# Patient Record
Sex: MALE | Race: WHITE | NOT HISPANIC OR LATINO | Employment: FULL TIME | URBAN - NONMETROPOLITAN AREA
[De-identification: names, ages, dates, MRNs, and addresses within clinical notes are randomized per-mention and may not be internally consistent; named-entity substitution may affect disease eponyms.]

---

## 2023-03-08 ENCOUNTER — TELEPHONE (OUTPATIENT)
Dept: PRIMARY CARE | Facility: CLINIC | Age: 53
End: 2023-03-08

## 2023-03-08 NOTE — TELEPHONE ENCOUNTER
The patient is requesting a letter for work. He called off on Saturday due to a stomach ache. He did not go to an urgent care to be seen.  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  The patient can be reached at the number listed

## 2023-12-06 ENCOUNTER — TELEPHONE (OUTPATIENT)
Dept: PRIMARY CARE | Facility: CLINIC | Age: 53
End: 2023-12-06
Payer: COMMERCIAL

## 2023-12-06 DIAGNOSIS — T59.811A SMOKE INHALATION: Primary | ICD-10-CM
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
## 2023-12-11 ENCOUNTER — OFFICE VISIT (OUTPATIENT)
Dept: PULMONOLOGY | Facility: CLINIC | Age: 53
End: 2023-12-11
Payer: COMMERCIAL

## 2023-12-11 ENCOUNTER — ANCILLARY PROCEDURE (OUTPATIENT)
Dept: RADIOLOGY | Facility: CLINIC | Age: 53
End: 2023-12-11
Payer: COMMERCIAL

## 2023-12-11 ENCOUNTER — LAB (OUTPATIENT)
Dept: LAB | Facility: LAB | Age: 53
End: 2023-12-11
Payer: COMMERCIAL

## 2023-12-11 VITALS
DIASTOLIC BLOOD PRESSURE: 76 MMHG | SYSTOLIC BLOOD PRESSURE: 117 MMHG | BODY MASS INDEX: 29.34 KG/M2 | HEART RATE: 71 BPM | WEIGHT: 210.4 LBS | TEMPERATURE: 96.9 F | OXYGEN SATURATION: 99 %

## 2023-12-11 DIAGNOSIS — T59.811A SMOKE INHALATION: ICD-10-CM

## 2023-12-11 DIAGNOSIS — R05.3 CHRONIC COUGH: ICD-10-CM

## 2023-12-11 DIAGNOSIS — R06.09 DYSPNEA ON EXERTION: Primary | ICD-10-CM

## 2023-12-11 PROCEDURE — 71046 X-RAY EXAM CHEST 2 VIEWS: CPT | Mod: FY

## 2023-12-11 PROCEDURE — 71046 X-RAY EXAM CHEST 2 VIEWS: CPT | Performed by: STUDENT IN AN ORGANIZED HEALTH CARE EDUCATION/TRAINING PROGRAM

## 2023-12-11 PROCEDURE — 4004F PT TOBACCO SCREEN RCVD TLK: CPT | Performed by: INTERNAL MEDICINE

## 2023-12-11 PROCEDURE — 86003 ALLG SPEC IGE CRUDE XTRC EA: CPT

## 2023-12-11 PROCEDURE — 36415 COLL VENOUS BLD VENIPUNCTURE: CPT

## 2023-12-11 PROCEDURE — 82785 ASSAY OF IGE: CPT

## 2023-12-11 PROCEDURE — 99204 OFFICE O/P NEW MOD 45 MIN: CPT | Performed by: INTERNAL MEDICINE

## 2023-12-11 NOTE — PROGRESS NOTES
Subjective   Patient ID: Asher Hardin is a 53 y.o. male who presents for Cough and Shortness of Breath.  HPI  This is a 53-year-old  male who is currently a  for the city of [place].  He has had problems with increased shortness of breath and cough with occasionally some yellow streaking in the sputum.  He has a past history of smoking a pack a day for 25 years and quit 10 years ago.  Feels at this point that he cannot get a deep breath.  He is also short of breath with increased activity.  This has been more a problem for him over the last 2 months.  He denies any significant change in exposure to gas or smoke or fumes.  Denies any wheezing or hemoptysis.  He has not had any recent chest x-rays.    He was surgically treated for Alfaro's esophagus.  He has also had procedures on his knees and shoulders that were endoscopically done.  His family history is positive for mother and father both being smokers and  from lung cancer.  The patient admits to 2-3 beers at 2-2 times per week.  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  He was born and raised in [place].  He reports that he has never been admitted to the hospital or urgent care for his breathing.  Review of Systems  Patient has lost about 30 pounds with the diet change.  His vision is good with his glasses.  He does have discomfort in both knees.  He was a smoker in the past as reported above all other review of systems were noncontributory.  Refer to the HPI.  Objective   Physical Exam  Oxygen saturation today was 100% on room air.  HEENT the patient has a class IV airway and there was no inflammation noted on examination of the oropharynx.  Patient has been told that he snores and has stop breathing episodes at nighttime.  Pulmonary, diminished breath sounds with few expiratory rhonchi bilaterally.  Cardio, heart sounds are regular rate and rhythm.  GI, bowel sounds are heard in all quadrants no tenderness  on palpation of the abdomen.  Extremities, no pretibial edema cyanosis or clubbing..  Psych, the patient is alert and oriented x 3.  The patient does not appear to be dyspneic at rest today in the office.  Assessment/Plan        Impressions:  1.  Dyspnea on exertion.  2.  Chronic cough.  3.  Past history of smoking.    Recommendations:  1.  Complete pulmonary function testing.  2.  Chest x-ray-PA and lateral.  3.  FeNO.  4.  RAST testing.  5.  Follow-up with the patient after his testing is completed and have further recommendations at that time.      This note was transcribed using the Dragon Dictation system.  There may be grammatical, punctuation, or verbiage errors that occur with voice recognition programs.    Kurt Arriaga DO 12/11/23 12:32 PM

## 2023-12-12 LAB

## 2023-12-19 ENCOUNTER — OFFICE VISIT (OUTPATIENT)
Dept: PULMONOLOGY | Facility: CLINIC | Age: 53
End: 2023-12-19
Payer: COMMERCIAL

## 2023-12-19 ENCOUNTER — HOSPITAL ENCOUNTER (OUTPATIENT)
Dept: RESPIRATORY THERAPY | Facility: HOSPITAL | Age: 53
Discharge: HOME | End: 2023-12-19
Payer: COMMERCIAL

## 2023-12-19 ENCOUNTER — APPOINTMENT (OUTPATIENT)
Dept: CARDIOLOGY | Facility: HOSPITAL | Age: 53
End: 2023-12-19
Payer: COMMERCIAL

## 2023-12-19 VITALS
OXYGEN SATURATION: 96 % | HEIGHT: 72 IN | HEART RATE: 73 BPM | DIASTOLIC BLOOD PRESSURE: 66 MMHG | BODY MASS INDEX: 28.85 KG/M2 | SYSTOLIC BLOOD PRESSURE: 104 MMHG | WEIGHT: 213 LBS

## 2023-12-19 DIAGNOSIS — T59.811A SMOKE INHALATION: ICD-10-CM

## 2023-12-19 DIAGNOSIS — R06.09 DYSPNEA ON EXERTION: ICD-10-CM

## 2023-12-19 DIAGNOSIS — J44.9 OBSTRUCTIVE AIRWAY DISEASE (MULTI): Primary | ICD-10-CM

## 2023-12-19 PROCEDURE — 99213 OFFICE O/P EST LOW 20 MIN: CPT | Performed by: INTERNAL MEDICINE

## 2023-12-19 PROCEDURE — 94729 DIFFUSING CAPACITY: CPT

## 2023-12-19 PROCEDURE — 4004F PT TOBACCO SCREEN RCVD TLK: CPT | Performed by: INTERNAL MEDICINE

## 2023-12-19 RX ORDER — ALBUTEROL SULFATE 90 UG/1
1 AEROSOL, METERED RESPIRATORY (INHALATION) AS NEEDED
COMMUNITY
End: 2023-12-19 | Stop reason: SDUPTHER

## 2023-12-19 RX ORDER — FLUTICASONE FUROATE AND VILANTEROL 100; 25 UG/1; UG/1
1 POWDER RESPIRATORY (INHALATION) DAILY
COMMUNITY
End: 2023-12-19 | Stop reason: SDUPTHER

## 2023-12-21 RX ORDER — FLUTICASONE FUROATE AND VILANTEROL 100; 25 UG/1; UG/1
1 POWDER RESPIRATORY (INHALATION) DAILY
Qty: 60 EACH | Refills: 5 | Status: SHIPPED | OUTPATIENT
Start: 2023-12-21

## 2023-12-21 RX ORDER — ALBUTEROL SULFATE 90 UG/1
1 AEROSOL, METERED RESPIRATORY (INHALATION) AS NEEDED
Qty: 18 G | Refills: 11 | Status: SHIPPED | OUTPATIENT
Start: 2023-12-21

## 2023-12-21 NOTE — PROGRESS NOTES
Subjective   Patient ID: Asher Hardin is a 53 y.o. male who presents for Follow-up (REV CHEST XRAY AND PFT).  HPI  Patient was seen today in the office on a follow-up visit.  He had pulmonary function testing done earlier today.  This indicated mild obstructive airway disease with significant improvement postbronchodilator.  There was also some evidence of air trapping.  He had normal gas transfer.  Review of Systems  Patient's chest x-ray from 12/11/2023 looked clear.  Objective   Physical Exam  Pulmonary, diminished breath sounds bilaterally.  Cardio, heart sounds are regular rate and rhythm.  Extremities, no pretibial edema cyanosis or clubbing.  Psych, patient was alert and oriented x 3.  The patient did not appear severely dyspneic with activity in the office.  Assessment/Plan        Impressions:  1.  Mild obstructive airway disease.  2.  Dyspnea on exertion.  Recommendations:  1.  Started the patient on Breo 100/25 inhaler to be taken daily.  2.  Albuterol MDI 2 puffs twice daily as needed shortness of breath.  3.  Follow-up with the patient in 3 weeks.      This note was transcribed using the Dragon Dictation system.  There may be grammatical, punctuation, or verbiage errors that occur with voice recognition programs.    Kurt Arriaga,  12/21/23 7:48 AM

## 2024-01-10 PROBLEM — K22.70 BARRETT'S ESOPHAGUS: Status: ACTIVE | Noted: 2024-01-10

## 2024-01-10 PROBLEM — S80.819A ABRASION OF LEG: Status: ACTIVE | Noted: 2024-01-10

## 2024-01-10 PROBLEM — K21.9 GERD (GASTROESOPHAGEAL REFLUX DISEASE): Status: ACTIVE | Noted: 2024-01-10

## 2024-01-10 PROBLEM — E78.5 HYPERLIPIDEMIA: Status: ACTIVE | Noted: 2024-01-10

## 2024-01-11 ENCOUNTER — OFFICE VISIT (OUTPATIENT)
Dept: PRIMARY CARE | Facility: CLINIC | Age: 54
End: 2024-01-11
Payer: COMMERCIAL

## 2024-01-11 ENCOUNTER — LAB (OUTPATIENT)
Dept: LAB | Facility: LAB | Age: 54
End: 2024-01-11
Payer: COMMERCIAL

## 2024-01-11 VITALS
TEMPERATURE: 98 F | BODY MASS INDEX: 29.12 KG/M2 | SYSTOLIC BLOOD PRESSURE: 129 MMHG | WEIGHT: 215 LBS | RESPIRATION RATE: 14 BRPM | HEIGHT: 72 IN | HEART RATE: 69 BPM | DIASTOLIC BLOOD PRESSURE: 77 MMHG | OXYGEN SATURATION: 97 %

## 2024-01-11 DIAGNOSIS — Z00.00 WELLNESS EXAMINATION: ICD-10-CM

## 2024-01-11 DIAGNOSIS — Z11.59 NEED FOR HEPATITIS C SCREENING TEST: ICD-10-CM

## 2024-01-11 DIAGNOSIS — E78.2 MIXED HYPERLIPIDEMIA: ICD-10-CM

## 2024-01-11 DIAGNOSIS — Z12.5 PROSTATE CANCER SCREENING: ICD-10-CM

## 2024-01-11 DIAGNOSIS — Z00.00 WELLNESS EXAMINATION: Primary | ICD-10-CM

## 2024-01-11 DIAGNOSIS — Z11.4 ENCOUNTER FOR SCREENING FOR HIV: ICD-10-CM

## 2024-01-11 DIAGNOSIS — Z23 NEED FOR TETANUS, DIPHTHERIA, AND ACELLULAR PERTUSSIS (TDAP) VACCINE: ICD-10-CM

## 2024-01-11 DIAGNOSIS — C19 COLORECTAL CANCER (MULTI): ICD-10-CM

## 2024-01-11 PROBLEM — S80.819A ABRASION OF LEG: Status: RESOLVED | Noted: 2024-01-10 | Resolved: 2024-01-11

## 2024-01-11 PROCEDURE — 87389 HIV-1 AG W/HIV-1&-2 AB AG IA: CPT

## 2024-01-11 PROCEDURE — 99396 PREV VISIT EST AGE 40-64: CPT | Performed by: FAMILY MEDICINE

## 2024-01-11 PROCEDURE — 86803 HEPATITIS C AB TEST: CPT

## 2024-01-11 PROCEDURE — 80061 LIPID PANEL: CPT

## 2024-01-11 PROCEDURE — 90715 TDAP VACCINE 7 YRS/> IM: CPT | Performed by: FAMILY MEDICINE

## 2024-01-11 PROCEDURE — 90471 IMMUNIZATION ADMIN: CPT | Performed by: FAMILY MEDICINE

## 2024-01-11 PROCEDURE — 84153 ASSAY OF PSA TOTAL: CPT

## 2024-01-11 PROCEDURE — 80053 COMPREHEN METABOLIC PANEL: CPT

## 2024-01-11 PROCEDURE — 36415 COLL VENOUS BLD VENIPUNCTURE: CPT

## 2024-01-11 PROCEDURE — 85027 COMPLETE CBC AUTOMATED: CPT

## 2024-01-11 ASSESSMENT — ENCOUNTER SYMPTOMS: SHORTNESS OF BREATH: 1
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
## 2024-01-11 NOTE — ASSESSMENT & PLAN NOTE
Lab Results   Component Value Date    CHOL 218 (H) 02/07/2022    CHOL 218 (H) 02/17/2021    CHOL 245 (H) 02/24/2018     Lab Results   Component Value Date    HDL 70.8 02/07/2022    HDL 58.8 02/17/2021    HDL 60.5 02/24/2018     Lab Results   Component Value Date    TRIG 130 02/07/2022    TRIG 87 02/17/2021    TRIG 90 02/24/2018   Stable.  Repeat lipid panel ordered.

## 2024-01-12 LAB
ALBUMIN SERPL BCP-MCNC: 4.4 G/DL (ref 3.4–5)
ALP SERPL-CCNC: 46 U/L (ref 33–120)
ALT SERPL W P-5'-P-CCNC: 20 U/L (ref 10–52)
ANION GAP SERPL CALC-SCNC: 12 MMOL/L (ref 10–20)
AST SERPL W P-5'-P-CCNC: 18 U/L (ref 9–39)
BILIRUB SERPL-MCNC: 0.8 MG/DL (ref 0–1.2)
BUN SERPL-MCNC: 11 MG/DL (ref 6–23)
CALCIUM SERPL-MCNC: 9.9 MG/DL (ref 8.6–10.6)
CHLORIDE SERPL-SCNC: 103 MMOL/L (ref 98–107)
CHOLEST SERPL-MCNC: 208 MG/DL (ref 0–199)
CHOLESTEROL/HDL RATIO: 2.9
CO2 SERPL-SCNC: 30 MMOL/L (ref 21–32)
CREAT SERPL-MCNC: 0.89 MG/DL (ref 0.5–1.3)
EGFRCR SERPLBLD CKD-EPI 2021: >90 ML/MIN/1.73M*2
ERYTHROCYTE [DISTWIDTH] IN BLOOD BY AUTOMATED COUNT: 12.9 % (ref 11.5–14.5)
GLUCOSE SERPL-MCNC: 98 MG/DL (ref 74–99)
HCT VFR BLD AUTO: 45.1 % (ref 41–52)
HCV AB SER QL: NONREACTIVE
HDLC SERPL-MCNC: 71.8 MG/DL
HGB BLD-MCNC: 15 G/DL (ref 13.5–17.5)
HIV 1+2 AB+HIV1 P24 AG SERPL QL IA: NONREACTIVE
LDLC SERPL CALC-MCNC: 116 MG/DL
MCH RBC QN AUTO: 32.8 PG (ref 26–34)
MCHC RBC AUTO-ENTMCNC: 33.3 G/DL (ref 32–36)
MCV RBC AUTO: 99 FL (ref 80–100)
NON HDL CHOLESTEROL: 136 MG/DL (ref 0–149)
NRBC BLD-RTO: 0 /100 WBCS (ref 0–0)
PLATELET # BLD AUTO: 227 X10*3/UL (ref 150–450)
POTASSIUM SERPL-SCNC: 4.8 MMOL/L (ref 3.5–5.3)
PROT SERPL-MCNC: 7.4 G/DL (ref 6.4–8.2)
PSA SERPL-MCNC: 2.26 NG/ML
RBC # BLD AUTO: 4.58 X10*6/UL (ref 4.5–5.9)
SODIUM SERPL-SCNC: 140 MMOL/L (ref 136–145)
TRIGL SERPL-MCNC: 102 MG/DL (ref 0–149)
VLDL: 20 MG/DL (ref 0–40)
WBC # BLD AUTO: 5.5 X10*3/UL (ref 4.4–11.3)

## 2024-01-18 ENCOUNTER — APPOINTMENT (OUTPATIENT)
Dept: PULMONOLOGY | Facility: CLINIC | Age: 54
End: 2024-01-18
Payer: COMMERCIAL

## 2024-02-19 ENCOUNTER — TELEPHONE (OUTPATIENT)
Dept: GASTROENTEROLOGY | Facility: CLINIC | Age: 54
End: 2024-02-19
Payer: COMMERCIAL

## 2024-02-19 DIAGNOSIS — Z12.11 COLON CANCER SCREENING: Primary | ICD-10-CM

## 2024-02-19 NOTE — TELEPHONE ENCOUNTER
Left message for patient offering to schedule colonoscopy. Left call back number of [phone]

## 2024-02-20 ENCOUNTER — APPOINTMENT (OUTPATIENT)
Dept: GASTROENTEROLOGY | Facility: CLINIC | Age: 54
End: 2024-02-20
Payer: COMMERCIAL

## 2024-02-20 RX ORDER — SODIUM, POTASSIUM,MAG SULFATES 17.5-3.13G
SOLUTION, RECONSTITUTED, ORAL ORAL
Qty: 354 ML | Refills: 0 | Status: SHIPPED | OUTPATIENT
Start: 2024-02-20 | End: 2024-03-01 | Stop reason: ALTCHOICE

## 2024-02-26 ENCOUNTER — ANESTHESIA EVENT (OUTPATIENT)
Dept: GASTROENTEROLOGY | Facility: EXTERNAL LOCATION | Age: 54
End: 2024-02-26

## 2024-03-01 ENCOUNTER — ANESTHESIA (OUTPATIENT)
Dept: GASTROENTEROLOGY | Facility: EXTERNAL LOCATION | Age: 54
End: 2024-03-01

## 2024-03-01 ENCOUNTER — HOSPITAL ENCOUNTER (OUTPATIENT)
Dept: GASTROENTEROLOGY | Facility: EXTERNAL LOCATION | Age: 54
Discharge: HOME | End: 2024-03-01
Payer: COMMERCIAL

## 2024-03-01 VITALS
HEART RATE: 62 BPM | BODY MASS INDEX: 2.8 KG/M2 | WEIGHT: 20 LBS | TEMPERATURE: 98.1 F | RESPIRATION RATE: 16 BRPM | OXYGEN SATURATION: 99 % | SYSTOLIC BLOOD PRESSURE: 109 MMHG | DIASTOLIC BLOOD PRESSURE: 80 MMHG | HEIGHT: 71 IN

## 2024-03-01 DIAGNOSIS — K22.70 BARRETT'S ESOPHAGUS WITHOUT DYSPLASIA: ICD-10-CM

## 2024-03-01 DIAGNOSIS — C19 COLORECTAL CANCER (MULTI): Primary | ICD-10-CM

## 2024-03-01 PROCEDURE — 45378 DIAGNOSTIC COLONOSCOPY: CPT | Performed by: INTERNAL MEDICINE

## 2024-03-01 RX ORDER — PROPOFOL 10 MG/ML
INJECTION, EMULSION INTRAVENOUS AS NEEDED
Status: DISCONTINUED | OUTPATIENT
Start: 2024-03-01 | End: 2024-03-01

## 2024-03-01 RX ORDER — SODIUM CHLORIDE 9 MG/ML
20 INJECTION, SOLUTION INTRAVENOUS CONTINUOUS
Status: DISCONTINUED | OUTPATIENT
Start: 2024-03-01 | End: 2024-03-02 | Stop reason: HOSPADM

## 2024-03-01 RX ORDER — LIDOCAINE HYDROCHLORIDE 20 MG/ML
INJECTION, SOLUTION INFILTRATION; PERINEURAL AS NEEDED
Status: DISCONTINUED | OUTPATIENT
Start: 2024-03-01 | End: 2024-03-01

## 2024-03-01 RX ADMIN — PROPOFOL 50 MG: 10 INJECTION, EMULSION INTRAVENOUS at 10:46

## 2024-03-01 RX ADMIN — PROPOFOL 50 MG: 10 INJECTION, EMULSION INTRAVENOUS at 10:53

## 2024-03-01 RX ADMIN — PROPOFOL 100 MG: 10 INJECTION, EMULSION INTRAVENOUS at 10:40

## 2024-03-01 RX ADMIN — PROPOFOL 50 MG: 10 INJECTION, EMULSION INTRAVENOUS at 10:43

## 2024-03-01 RX ADMIN — PROPOFOL 50 MG: 10 INJECTION, EMULSION INTRAVENOUS at 10:49

## 2024-03-01 RX ADMIN — SODIUM CHLORIDE: 9 INJECTION, SOLUTION INTRAVENOUS at 10:38

## 2024-03-01 RX ADMIN — LIDOCAINE HYDROCHLORIDE 50 MG: 20 INJECTION, SOLUTION INFILTRATION; PERINEURAL at 10:40

## 2024-03-01 SDOH — HEALTH STABILITY: MENTAL HEALTH: CURRENT SMOKER: 0

## 2024-03-01 ASSESSMENT — PAIN SCALES - GENERAL
PAIN_LEVEL: 0
PAINLEVEL_OUTOF10: 0 - NO PAIN
PAINLEVEL_OUTOF10: 0 - NO PAIN

## 2024-03-01 ASSESSMENT — COLUMBIA-SUICIDE SEVERITY RATING SCALE - C-SSRS
6. HAVE YOU EVER DONE ANYTHING, STARTED TO DO ANYTHING, OR PREPARED TO DO ANYTHING TO END YOUR LIFE?: NO
2. HAVE YOU ACTUALLY HAD ANY THOUGHTS OF KILLING YOURSELF?: NO
1. IN THE PAST MONTH, HAVE YOU WISHED YOU WERE DEAD OR WISHED YOU COULD GO TO SLEEP AND NOT WAKE UP?: NO

## 2024-03-01 ASSESSMENT — PAIN - FUNCTIONAL ASSESSMENT
PAIN_FUNCTIONAL_ASSESSMENT: 0-10
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
## 2024-03-01 NOTE — DISCHARGE INSTRUCTIONS
Patient Instructions Post Procedure      The anesthetics, sedatives or narcotics which were given to you today will be acting in your body for the next 24 hours, so you might feel a little sleepy or groggy.  This feeling should slowly wear off. Carefully read and follow the instructions.     You received sedation today:  - Do not drive or operate any machinery or power tools of any kind.   - No alcoholic beverages today, not even beer or wine.  - Do not make any important decisions or sign any legal documents.  - No over the counter medications that contain alcohol or that may cause drowsiness.    While it is common to experience mild to moderate abdominal distention, gas, or belching after your procedure, if any of these symptoms occur following discharge from the GI Lab or within one week of having your procedure, call the [facility] to be advised whether a visit to your nearest Urgent Care or Emergency Department is indicated.  Take this paper with you if you go.   - If you develop an allergic reaction to the medications that were given during your procedure such as difficulty breathing, rash, hives, severe nausea, vomiting or lightheadedness.  - If you experience chest pain, shortness of breath, severe abdominal pain, fevers and chills.  -If you develop signs and symptoms of bleeding such as blood in your spit, if your stools turn black, tarry, or bloody  - If you have not urinated within 8 hours following your procedure.  - If your IV site becomes painful, red, inflamed, or looks infected.    If you received a biopsy/polypectomy/sphincterotomy the following instructions apply below:  __ Do not use Aspirin containing products, non-steroidal medications or anti-coagulants for one week following your procedure. (Examples of these types of medications are: Advil, Arthrotec, Aleve, Coumadin, Ecotrin, Heparin, Ibuprofen, Indocin, Motrin, Naprosyn, Nuprin, Plavix, Vioxx, and Voltarin, or their generic  forms.  This list is not all-inclusive.  Check with your physician or pharmacist before resuming medications.)   __ Eat a soft diet today.  Avoid foods that are poorly digested for the next 24 hours.  These foods would include: nuts, beans, lettuce, red meats, and fried foods. Start with liquids and advance your diet as tolerated, gradually work up to eating solids.   __ Do not have a Barium Study or Enema for one week.    Your physician recommends the additional following instructions:    -You have a contact number available for emergencies. The signs and symptoms of potential delayed complications were discussed with you. You may return to normal activities tomorrow.  -Resume your previous diet or other if specified.  -Continue your present medications.   -We are waiting for your pathology results, if applicable.  -The findings and recommendations have been discussed with you and/or family.  - Please see Medication Reconciliation Form for new medication/medications prescribed.     If you experience any problems or have any questions following discharge from the GI Lab, please call: [phone] from 7 am- 4:30 pm.  In the event of an emergency please go to the closest Emergency Department or call Dr. Danielson [phone]

## 2024-03-01 NOTE — ANESTHESIA PREPROCEDURE EVALUATION
Patient: Asher Hardin    Procedure Information       Date/Time: 03/01/24 1030    Scheduled providers: Stepan Danielson MD; Anita Saba RN    Procedure: COLONOSCOPY    Location: [facility]            Relevant Problems   Cardiovascular   (+) Mixed hyperlipidemia      GI   (+) GERD (gastroesophageal reflux disease)       Clinical information reviewed:    Allergies                NPO Detail:  No data recorded     Physical Exam    Airway  Mallampati: II  TM distance: >3 FB  Neck ROM: full     Cardiovascular - normal exam     Dental - normal exam     Pulmonary - normal exam  Breath sounds clear to auscultation     Abdominal            Anesthesia Plan    History of general anesthesia?: yes  History of complications of general anesthesia?: no    ASA 2     MAC     The patient is not a current smoker.    intravenous induction   Anesthetic plan and risks discussed with patient.    Plan discussed with CRNA.

## 2024-03-01 NOTE — ANESTHESIA POSTPROCEDURE EVALUATION
Patient: Asher Hardin    Procedure Summary       Date: 03/01/24 Room / Location: [facility]    Anesthesia Start: 1038 Anesthesia Stop:     Procedure: COLONOSCOPY Diagnosis:       Colorectal cancer (CMS/HCC)      Colorectal cancer (CMS/HCC)    Scheduled Providers: Stepan Danielson MD; Anita Saba RN Responsible Provider: MARYANA Faust    Anesthesia Type: MAC ASA Status: 2            Anesthesia Type: MAC    Vitals Value Taken Time   /69 03/01/24 1059   Temp 36.7 °C (98.1 °F) 03/01/24 1059   Pulse 68 03/01/24 1059   Resp 14 03/01/24 1059   SpO2 98 % 03/01/24 1059       Anesthesia Post Evaluation    Patient location during evaluation: bedside  Patient participation: complete - patient cannot participate  Level of consciousness: awake and responsive to verbal stimuli  Pain score: 0  Pain management: adequate  Airway patency: patent  Cardiovascular status: acceptable and hemodynamically stable  Respiratory status: acceptable  Hydration status: acceptable  Postoperative Nausea and Vomiting: none        No notable events documented.

## 2024-05-31 ENCOUNTER — ANESTHESIA EVENT (OUTPATIENT)
Dept: GASTROENTEROLOGY | Facility: EXTERNAL LOCATION | Age: 54
End: 2024-05-31

## 2024-06-12 ENCOUNTER — APPOINTMENT (OUTPATIENT)
Dept: GASTROENTEROLOGY | Facility: EXTERNAL LOCATION | Age: 54
End: 2024-06-12
Payer: COMMERCIAL

## 2024-06-12 ENCOUNTER — ANESTHESIA (OUTPATIENT)
Dept: GASTROENTEROLOGY | Facility: EXTERNAL LOCATION | Age: 54
End: 2024-06-12

## 2024-06-12 VITALS
HEIGHT: 71 IN | SYSTOLIC BLOOD PRESSURE: 103 MMHG | OXYGEN SATURATION: 99 % | BODY MASS INDEX: 28 KG/M2 | HEART RATE: 56 BPM | WEIGHT: 200 LBS | TEMPERATURE: 96.8 F | DIASTOLIC BLOOD PRESSURE: 78 MMHG | RESPIRATION RATE: 12 BRPM

## 2024-06-12 DIAGNOSIS — K22.70 BARRETT'S ESOPHAGUS WITHOUT DYSPLASIA: Primary | ICD-10-CM

## 2024-06-12 PROCEDURE — 43239 EGD BIOPSY SINGLE/MULTIPLE: CPT | Performed by: INTERNAL MEDICINE

## 2024-06-12 PROCEDURE — 88305 TISSUE EXAM BY PATHOLOGIST: CPT | Mod: TC,ELYLAB | Performed by: INTERNAL MEDICINE

## 2024-06-12 RX ORDER — PROPOFOL 10 MG/ML
INJECTION, EMULSION INTRAVENOUS AS NEEDED
Status: DISCONTINUED | OUTPATIENT
Start: 2024-06-12 | End: 2024-06-12

## 2024-06-12 RX ORDER — LIDOCAINE HYDROCHLORIDE 20 MG/ML
INJECTION, SOLUTION INFILTRATION; PERINEURAL AS NEEDED
Status: DISCONTINUED | OUTPATIENT
Start: 2024-06-12 | End: 2024-06-12

## 2024-06-12 RX ORDER — SODIUM CHLORIDE 9 MG/ML
20 INJECTION, SOLUTION INTRAVENOUS CONTINUOUS
Status: DISCONTINUED | OUTPATIENT
Start: 2024-06-12 | End: 2024-06-13 | Stop reason: HOSPADM

## 2024-06-12 SDOH — HEALTH STABILITY: MENTAL HEALTH: CURRENT SMOKER: 0

## 2024-06-12 ASSESSMENT — PAIN - FUNCTIONAL ASSESSMENT
PAIN_FUNCTIONAL_ASSESSMENT: 0-10

## 2024-06-12 ASSESSMENT — PAIN SCALES - GENERAL
PAIN_LEVEL: 0
PAINLEVEL_OUTOF10: 0 - NO PAIN

## 2024-06-12 NOTE — DISCHARGE INSTRUCTIONS
Patient Instructions Post Procedure      The anesthetics, sedatives or narcotics which were given to you today will be acting in your body for the next 24 hours, so you might feel a little sleepy or groggy.  This feeling should slowly wear off. Carefully read and follow the instructions.     You received sedation today:  - Do not drive or operate any machinery or power tools of any kind.   - No alcoholic beverages today, not even beer or wine.  - Do not make any important decisions or sign any legal documents.  - No over the counter medications that contain alcohol or that may cause drowsiness.    While it is common to experience mild to moderate abdominal distention, gas, or belching after your procedure, if any of these symptoms occur following discharge from the GI Lab or within one week of having your procedure, call the [facility] to be advised whether a visit to your nearest Urgent Care or Emergency Department is indicated.  Take this paper with you if you go.   - If you develop an allergic reaction to the medications that were given during your procedure such as difficulty breathing, rash, hives, severe nausea, vomiting or lightheadedness.  - If you experience chest pain, shortness of breath, severe abdominal pain, fevers and chills.  -If you develop signs and symptoms of bleeding such as blood in your spit, if your stools turn black, tarry, or bloody  - If you have not urinated within 8 hours following your procedure.  - If your IV site becomes painful, red, inflamed, or looks infected.    If you received a biopsy/polypectomy/sphincterotomy the following instructions apply below:  __ Do not use Aspirin containing products, non-steroidal medications or anti-coagulants for one week following your procedure. (Examples of these types of medications are: Advil, Arthrotec, Aleve, Coumadin, Ecotrin, Heparin, Ibuprofen, Indocin, Motrin, Naprosyn, Nuprin, Plavix, Vioxx, and Voltarin, or their generic  forms.  This list is not all-inclusive.  Check with your physician or pharmacist before resuming medications.)   __ Eat a soft diet today.  Avoid foods that are poorly digested for the next 24 hours.  These foods would include: nuts, beans, lettuce, red meats, and fried foods. Start with liquids and advance your diet as tolerated, gradually work up to eating solids.   __ Do not have a Barium Study or Enema for one week.    Your physician recommends the additional following instructions:    -You have a contact number available for emergencies. The signs and symptoms of potential delayed complications were discussed with you. You may return to normal activities tomorrow.  -Resume your previous diet or other if specified.  -Continue your present medications.   -We are waiting for your pathology results, if applicable.  -The findings and recommendations have been discussed with you and/or family.  - Please see Medication Reconciliation Form for new medication/medications prescribed.     If you experience any problems or have any questions following discharge from the GI Lab, please call: [phone] from 7 am- 4:30 pm.  In the event of an emergency please go to the closest Emergency Department or call Dr. Danielson [phone]

## 2024-06-12 NOTE — LETTER
June 12, 2024     Patient: Asher Hardin   YOB: 1970   Date of Visit: 6/12/2024       To Whom It May Concern:    Asher Hardin was here for an endoscopic procedure. Please excuse Asher for his absence from work on this day to make the appointment.  Leobardo's arrival for procedure was 7:30am.  A  was needed as he was unable to drive today.    If you have any questions or concerns, please don't hesitate to call.         Sincerely,         Stepan Danielson MD        CC:   No Recipients

## 2024-06-12 NOTE — ANESTHESIA PREPROCEDURE EVALUATION
Patient: Asher Hardin    Procedure Information       Date/Time: 06/12/24 0800    Scheduled providers: Stepan Danielson MD; JUWAN Fofana-CRNA    Procedure: EGD    Location: [facility]            Relevant Problems   Cardiac   (+) Mixed hyperlipidemia      GI   (+) GERD (gastroesophageal reflux disease)       Clinical information reviewed:   Tobacco  Allergies  Meds   Med Hx  Surg Hx   Fam Hx  Soc Hx        NPO Detail:  NPO/Void Status  Carbohydrate Drink Given Prior to Surgery? : Y  Date of Last Liquid: 06/11/24  Time of Last Liquid: 2100  Date of Last Solid: 06/11/24  Time of Last Solid: 2000  Last Intake Type: Light meal  Time of Last Void: 0758         Physical Exam    Airway  Mallampati: II     Cardiovascular - normal exam     Dental - normal exam     Pulmonary - normal exam     Abdominal - normal exam         Anesthesia Plan    History of general anesthesia?: yes  History of complications of general anesthesia?: no    ASA 2     MAC   (Preoxygenated 2L prior to procedure.  Patient positioned self to comfort prior to sedation administered; eyes closed; continuous monitoring)  The patient is not a current smoker.    intravenous induction   Anesthetic plan and risks discussed with patient.    Plan discussed with CRNA.

## 2024-06-12 NOTE — ANESTHESIA POSTPROCEDURE EVALUATION
Patient: Asher Hardin    Procedure Summary       Date: 06/12/24 Room / Location: [facility]    Anesthesia Start: 0830 Anesthesia Stop:     Procedure: EGD Diagnosis:       Alfaro's esophagus without dysplasia      Alfaro's esophagus without dysplasia    Scheduled Providers: Stepan Danielson MD; MARYANA Fofana Responsible Provider: MARYANA Fofana    Anesthesia Type: MAC ASA Status: 2            Anesthesia Type: MAC    Vitals Value Taken Time   /73 06/12/24 0843   Temp 36 06/12/24 0843   Pulse 59 06/12/24 0843   Resp 18 06/12/24 0843   SpO2 97 06/12/24 0843       Anesthesia Post Evaluation    Patient location during evaluation: bedside  Patient participation: complete - patient participated  Level of consciousness: awake  Pain score: 0  Pain management: adequate  Airway patency: patent  Cardiovascular status: acceptable  Respiratory status: acceptable and room air  Hydration status: acceptable  Postoperative Nausea and Vomiting: none      No notable events documented.
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
## 2024-06-20 LAB
LABORATORY COMMENT REPORT: NORMAL
PATH REPORT.FINAL DX SPEC: NORMAL
PATH REPORT.GROSS SPEC: NORMAL
PATH REPORT.TOTAL CANCER: NORMAL

## 2024-07-17 ENCOUNTER — OFFICE VISIT (OUTPATIENT)
Dept: PRIMARY CARE | Facility: CLINIC | Age: 54
End: 2024-07-17
Payer: COMMERCIAL

## 2024-07-17 VITALS
DIASTOLIC BLOOD PRESSURE: 68 MMHG | HEART RATE: 71 BPM | OXYGEN SATURATION: 95 % | SYSTOLIC BLOOD PRESSURE: 113 MMHG | TEMPERATURE: 97.2 F | RESPIRATION RATE: 12 BRPM

## 2024-07-17 DIAGNOSIS — R09.82 POSTNASAL DRIP: ICD-10-CM

## 2024-07-17 DIAGNOSIS — R05.3 PERSISTENT COUGH: Primary | ICD-10-CM

## 2024-07-17 PROCEDURE — 99213 OFFICE O/P EST LOW 20 MIN: CPT | Performed by: FAMILY MEDICINE

## 2024-07-17 RX ORDER — MONTELUKAST SODIUM 10 MG/1
10 TABLET ORAL NIGHTLY
Qty: 30 TABLET | Refills: 0 | Status: SHIPPED | OUTPATIENT
Start: 2024-07-17 | End: 2024-08-16

## 2024-07-17 RX ORDER — IPRATROPIUM BROMIDE 21 UG/1
2 SPRAY, METERED NASAL EVERY 12 HOURS
Qty: 30 ML | Refills: 0 | Status: SHIPPED | OUTPATIENT
Start: 2024-07-17 | End: 2024-07-24

## 2024-07-17 ASSESSMENT — ENCOUNTER SYMPTOMS
SINUS PAIN: 1
WHEEZING: 0
COUGH: 1
SHORTNESS OF BREATH: 1
FEVER: 0
VOICE CHANGE: 1

## 2024-07-17 NOTE — PROGRESS NOTES
Subjective   Patient ID: Asher Hardin is a 54 y.o. male who presents for Cough (Cough for month,  and was breathing in stuff on Sunday with job, sounds raspy, pressure in face, coughing up yellow mucous).    Cough  Associated symptoms include postnasal drip and shortness of breath. Pertinent negatives include no fever or wheezing.   X 1 month  Patient is followed by a Pulmonologist, dx with mild COPD. Started on 2 inhalers and does not take them as he does not like to use it.   He thinks he may have allergies-he states his cough is productive (yellow sputum) and admits to postnasal drip  He does not take any medication other than ibuprofen, reports a sinus headache  Denies fevers, wheezing  Reports when he does take an inhaler it does not change his symptoms    Review of Systems   Constitutional:  Negative for fever.   HENT:  Positive for postnasal drip, sinus pain and voice change.    Respiratory:  Positive for cough and shortness of breath. Negative for wheezing.    All other systems reviewed and are negative.      Objective   /68 (BP Location: Left arm, Patient Position: Sitting, BP Cuff Size: Adult)   Pulse 71   Temp 36.2 °C (97.2 °F)   Resp 12   SpO2 95%     Physical Exam  Constitutional: Well developed, well nourished, alert and in no acute distress.  Head and Face: NC/AT  Eyes: Normal external exam.   ENT: External inspection of ears normal, tympanic membranes visualized and normal. Nasal mucosa and turbinates swollen and erythematous, clear nasal discharge present. Oral mucosa moist, oropharynx clear without tonsillar exudate or erythema.   Neck: Supple. No cervical lymphadenopathy   Cardiovascular: Regular rate and rhythm, normal S1 and S2, no murmurs, gallops, or rubs.   Pulmonary: No respiratory distress, lungs clear to auscultation bilaterally. No wheezes, rhonchi, rales.  Skin: Warm, well perfused, normal skin turgor and color.   Neurologic: Cranial nerves II-XII grossly  intact.    Assessment/Plan   START Atrovent nasal spray as directed, may reduce to 1 spray in each nostril 1-2x/day if too drying.    Start singulair at night time as directed x 2 weeks.    Drink at least 6-8 glasses of water daily to thin secretions.  Mucinex can be used if secretions remain thick.      A teaspoon of honey every 4 hours as needed for cough has been shown to reduce cough as well or better than over-the-counter cough suppressants.  Delsym or Robitussin are recommended to stop cough.  Cough drops can also be helpful.     For nasal congestion, please use Tulio Med Sinus Rinse at least once daily to rinse out your sinuses. You can also try Flonase nasal spray over the counter - 1-2 sprays in each nostril daily. You can also consider Sudafed or Phenylephrine for nasal congestion but avoid if have hypertension and be aware can stimulate and cause problems sleeping.  Do not use for more than 5 days. Afrin decongestant nasal spray (oxymetazoline) can also be used for 2-3 days only.     For drainage problems, try Allegra, Claritin or Zyrtec.      For sore throat, try honey in tea, Chloraseptic, Cepacol throat lozenges, and salt water gargles.      Fever or aches can be helped by taking acetaminophen (Tylenol) every four hours as needed, or ibuprofen (Motrin, Advil) or naproxen (Aleve) as directed if you are able.   Maximum dosing of ibuprofen is 800 mg every 8 hours and maximum dose of tylenol is 1,000 mg every 8 hours - do not use for longer than 1 week unless directed by your doctor.       If you should develop a fever and worsening cough or nasal secretions with consistent yellow or green phlegm, please contact us.

## 2024-08-14 DIAGNOSIS — R05.3 PERSISTENT COUGH: ICD-10-CM

## 2024-08-14 DIAGNOSIS — R09.82 POSTNASAL DRIP: ICD-10-CM

## 2024-08-14 RX ORDER — MONTELUKAST SODIUM 10 MG/1
10 TABLET ORAL NIGHTLY
Qty: 30 TABLET | Refills: 0 | OUTPATIENT
Start: 2024-08-14 | End: 2024-09-13

## 2024-08-28 ENCOUNTER — TELEPHONE (OUTPATIENT)
Dept: PRIMARY CARE | Facility: CLINIC | Age: 54
End: 2024-08-28
Payer: COMMERCIAL

## 2024-08-28 NOTE — TELEPHONE ENCOUNTER
Pt stopped by. He is requesting a note for work. He states he called off sick. Pt was not seen by our office for the reason he needs a note. I advised him we can only write a note if he was seen here. Pt stated that [facility] has done this in the past and has pt come in days later to be seen. Pt states he only needs the note for yesterday 8/27/24 and returning 8/28/24.

## 2024-08-28 NOTE — TELEPHONE ENCOUNTER
Called pt. He states he had diarrhea. He used pepto bismol and imodium. He states he is feeling better.

## 2024-08-28 NOTE — TELEPHONE ENCOUNTER
I need more information to allow medical work excuse  Need symptoms, when started, what treatment he did, and if symptoms are now resolved.  If not resolved, need to do visit in person or virtual to evaluate.

## 2024-11-29 ENCOUNTER — OFFICE VISIT (OUTPATIENT)
Dept: PRIMARY CARE | Facility: CLINIC | Age: 54
End: 2024-11-29
Payer: COMMERCIAL

## 2024-11-29 VITALS
OXYGEN SATURATION: 98 % | WEIGHT: 218.2 LBS | RESPIRATION RATE: 16 BRPM | HEART RATE: 66 BPM | TEMPERATURE: 98 F | BODY MASS INDEX: 30.43 KG/M2 | SYSTOLIC BLOOD PRESSURE: 130 MMHG | DIASTOLIC BLOOD PRESSURE: 83 MMHG

## 2024-11-29 DIAGNOSIS — M77.11 LATERAL EPICONDYLITIS OF RIGHT ELBOW: Primary | ICD-10-CM

## 2024-11-29 PROCEDURE — 99213 OFFICE O/P EST LOW 20 MIN: CPT | Performed by: STUDENT IN AN ORGANIZED HEALTH CARE EDUCATION/TRAINING PROGRAM

## 2024-11-29 RX ORDER — MELOXICAM 15 MG/1
15 TABLET ORAL DAILY
Qty: 14 TABLET | Refills: 0 | Status: SHIPPED | OUTPATIENT
Start: 2024-11-29

## 2024-11-29 ASSESSMENT — ENCOUNTER SYMPTOMS: PAIN: 1

## 2024-11-29 ASSESSMENT — PAIN SCALES - GENERAL: PAINLEVEL_OUTOF10: 8

## 2024-11-29 NOTE — PROGRESS NOTES
FAMILY MEDICINE  OFFICE VISIT   Asher Hardin  [id number]  1970    PCP: Blue Luna MD     Chief Complaint:   Chief Complaint   Patient presents with    Elbow Pain     X2 weeks. No known injury      SUBJECTIVE     Asher Hardin is a 54 y.o. English-speaking male, who presents to the clinic with complaints of elbow pain.    Pain  This is a new problem. The current episode started 1 to 4 weeks ago. The problem occurs constantly. The problem has been gradually worsening since onset. The pain occurs in the context of recent physical stress. The pain is present in the right elbow. The pain is medium. The symptoms are aggravated by any movement and exercise. Associated symptoms include joint swelling. Pertinent negatives include no chest pain, fatigue, fever, nausea, rash, sensory change, shortness of breath, swollen glands or weakness. Past treatments include nothing. The treatment provided no relief.   - RIGHT Elbow   - , no recent work related injuries   - Started 2 wks ago  - No trauma  - Play pickleball and ping pong  - Hasn't tried anything. Doesn't like to take meds.     The following portions of the patient's chart were reviewed in this encounter and updated as appropriate:  Tobacco  Allergies  Meds  Problems  Med Hx  Surg Hx  Fam Hx         Home Medication List:  Current Outpatient Medications   Medication Instructions    albuterol (Proventil HFA) 90 mcg/actuation inhaler 1 puff, inhalation, As needed    fluticasone furoate-vilanteroL (Breo Ellipta) 100-25 mcg/dose inhaler 1 puff, inhalation, Daily    ipratropium (Atrovent) 21 mcg (0.03 %) nasal spray 2 sprays, Each Nostril, Every 12 hours    meloxicam (MOBIC) 15 mg, oral, Daily    montelukast (SINGULAIR) 10 mg, oral, Nightly         OBJECTIVE   /83 (BP Location: Right arm, Patient Position: Sitting, BP Cuff Size: Large adult)   Pulse 66   Temp 36.7 °C (98 °F) (Temporal)   Resp 16   Wt 99 kg (218 lb 3.2 oz)   SpO2 98%    BMI 30.43 kg/m²   Vital signs and pulse oximetry reviewed.     Physical Exam  Vitals and nursing note reviewed.   Constitutional:       Appearance: Normal appearance.   HENT:      Head: Normocephalic and atraumatic.      Right Ear: External ear normal.      Left Ear: External ear normal.      Nose: Nose normal. No congestion or rhinorrhea.   Eyes:      General: No scleral icterus.     Conjunctiva/sclera: Conjunctivae normal.   Cardiovascular:      Rate and Rhythm: Normal rate and regular rhythm.      Heart sounds: No murmur heard.  Pulmonary:      Effort: Pulmonary effort is normal. No respiratory distress.   Musculoskeletal:      Right shoulder: No tenderness. Normal range of motion.      Left shoulder: No tenderness. Normal range of motion.      Right elbow: Swelling (lateral epicondyle) present. No deformity, effusion or lacerations. Decreased range of motion. Tenderness present in lateral epicondyle. No radial head, medial epicondyle or olecranon process tenderness.      Left elbow: No swelling or effusion. Normal range of motion. No tenderness. No radial head, medial epicondyle, lateral epicondyle or olecranon process tenderness.      Right wrist: No effusion, tenderness or bony tenderness. Normal range of motion.      Left wrist: No effusion, tenderness or bony tenderness. Normal range of motion.      Right lower leg: No edema.      Left lower leg: No edema.      Comments: TTP over lateral epicondyle RIGHT  Pain with pronation and supination of RIGHT elbow.    Skin:     General: Skin is warm.      Coloration: Skin is not jaundiced.   Neurological:      Mental Status: He is alert. Mental status is at baseline.   Psychiatric:         Mood and Affect: Mood normal.         Behavior: Behavior normal.         ASSESSMENT & PLAN     Problem List Items Addressed This Visit       Lateral epicondylitis of right elbow - Primary    Current Assessment & Plan     Patient with new evidence of lateral epicondylitis likely due  to overuse of the right elbow with job, pickleball, and ping pong. Patient is very physically active, is a  and works construction as a side job.   - Will obtain XR imaging of RIGHT elbow to rule-out other sources of swelling and pain.   - Will trial Mobic 15 daily x 14 days.   - Demonstrated and provided Lateral epicondylitis exercises today.   - Advise taking rest from pickleball and ping pong for 1 week at least.   - Patient to  message in 2 weeks if not improved. Could consider steroid burst at that time.   - Patient is due for WELL Exam, will schedule this today.   -  message with results.          Relevant Medications    meloxicam (Mobic) 15 mg tablet    Other Relevant Orders    Follow Up In [facility] - PCP - Health Maintenance    XR elbow right 3+ views       [id number]    Follow-Up Recommendations: 1 month for Well Exam     Please excuse any typos or grammatical errors, part of this note was constructed with Dragon dictation software.    Amber Monreal DO, MSEd  [facility]   Office: [phone]  11/30/2024 3:11 PM

## 2024-11-29 NOTE — PATIENT INSTRUCTIONS
- Take a rest of Pickleball and Ping Pong for 1 week   - If things are not better in 2 weeks, send me a [employer] message and we can try a steroid burst.

## 2024-11-30 PROBLEM — Z57.9: Status: ACTIVE | Noted: 2024-11-30

## 2024-11-30 PROBLEM — R05.3 PERSISTENT COUGH: Status: ACTIVE | Noted: 2024-11-30

## 2024-11-30 PROBLEM — D22.9 MELANOCYTIC NEVUS: Status: ACTIVE | Noted: 2024-11-30

## 2024-11-30 PROBLEM — Z98.890 POST-OPERATIVE NAUSEA AND VOMITING: Status: ACTIVE | Noted: 2024-11-30

## 2024-11-30 PROBLEM — R11.2 POST-OPERATIVE NAUSEA AND VOMITING: Status: ACTIVE | Noted: 2024-11-30

## 2024-11-30 PROBLEM — K44.9 HIATAL HERNIA: Status: ACTIVE | Noted: 2024-11-30

## 2024-11-30 PROBLEM — M77.11 LATERAL EPICONDYLITIS OF RIGHT ELBOW: Status: ACTIVE | Noted: 2024-11-30

## 2024-11-30 ASSESSMENT — ENCOUNTER SYMPTOMS
SENSORY CHANGE: 0
FEVER: 0
NAUSEA: 0
SHORTNESS OF BREATH: 0
WEAKNESS: 0
SWOLLEN GLANDS: 0
JOINT SWELLING: 1
FATIGUE: 0

## 2024-11-30 NOTE — ASSESSMENT & PLAN NOTE
Patient with new evidence of lateral epicondylitis likely due to overuse of the right elbow with job, pickleball, and ping pong. Patient is very physically active, is a  and works construction as a side job.   - Will obtain XR imaging of RIGHT elbow to rule-out other sources of swelling and pain.   - Will trial Mobic 15 daily x 14 days.   - Demonstrated and provided Lateral epicondylitis exercises today.   - Advise taking rest from pickleball and ping pong for 1 week at least.   - Patient to  message in 2 weeks if not improved. Could consider steroid burst at that time.   - Patient is due for WELL Exam, will schedule this today.   -  message with results.

## 2024-12-04 ENCOUNTER — LAB (OUTPATIENT)
Dept: LAB | Facility: LAB | Age: 54
End: 2024-12-04
Payer: COMMERCIAL

## 2024-12-04 ENCOUNTER — APPOINTMENT (OUTPATIENT)
Dept: PRIMARY CARE | Facility: CLINIC | Age: 54
End: 2024-12-04
Payer: COMMERCIAL

## 2024-12-04 VITALS
HEIGHT: 70 IN | WEIGHT: 216.9 LBS | BODY MASS INDEX: 31.05 KG/M2 | OXYGEN SATURATION: 96 % | SYSTOLIC BLOOD PRESSURE: 109 MMHG | TEMPERATURE: 96.7 F | HEART RATE: 67 BPM | DIASTOLIC BLOOD PRESSURE: 69 MMHG

## 2024-12-04 DIAGNOSIS — R94.31 ABNORMAL EKG: ICD-10-CM

## 2024-12-04 DIAGNOSIS — Z00.00 WELLNESS EXAMINATION: Primary | ICD-10-CM

## 2024-12-04 DIAGNOSIS — R94.31 NONSPECIFIC ST-T CHANGES: ICD-10-CM

## 2024-12-04 DIAGNOSIS — M77.11 LATERAL EPICONDYLITIS OF RIGHT ELBOW: ICD-10-CM

## 2024-12-04 DIAGNOSIS — J45.30 MILD PERSISTENT ASTHMA WITHOUT COMPLICATION (HHS-HCC): ICD-10-CM

## 2024-12-04 DIAGNOSIS — R06.09 DYSPNEA ON EXERTION: ICD-10-CM

## 2024-12-04 DIAGNOSIS — Z57.9 OCCUPATIONAL EXPOSURE TO UNSPECIFIED RISK FACTOR: ICD-10-CM

## 2024-12-04 DIAGNOSIS — Z00.00 WELL ADULT EXAM: ICD-10-CM

## 2024-12-04 DIAGNOSIS — Z00.00 WELL ADULT EXAM: Primary | ICD-10-CM

## 2024-12-04 LAB
ALBUMIN SERPL BCP-MCNC: 4.4 G/DL (ref 3.4–5)
ALP SERPL-CCNC: 49 U/L (ref 33–120)
ALT SERPL W P-5'-P-CCNC: 20 U/L (ref 10–52)
ANION GAP SERPL CALC-SCNC: 12 MMOL/L (ref 10–20)
AST SERPL W P-5'-P-CCNC: 20 U/L (ref 9–39)
BASOPHILS # BLD AUTO: 0.05 X10*3/UL (ref 0–0.1)
BASOPHILS NFR BLD AUTO: 0.9 %
BILIRUB SERPL-MCNC: 0.7 MG/DL (ref 0–1.2)
BUN SERPL-MCNC: 9 MG/DL (ref 6–23)
CALCIUM SERPL-MCNC: 9.5 MG/DL (ref 8.6–10.6)
CHLORIDE SERPL-SCNC: 105 MMOL/L (ref 98–107)
CHOLEST SERPL-MCNC: 216 MG/DL (ref 0–199)
CHOLESTEROL/HDL RATIO: 3.1
CO2 SERPL-SCNC: 30 MMOL/L (ref 21–32)
CREAT SERPL-MCNC: 0.88 MG/DL (ref 0.5–1.3)
EGFRCR SERPLBLD CKD-EPI 2021: >90 ML/MIN/1.73M*2
EOSINOPHIL # BLD AUTO: 0.15 X10*3/UL (ref 0–0.7)
EOSINOPHIL NFR BLD AUTO: 2.6 %
ERYTHROCYTE [DISTWIDTH] IN BLOOD BY AUTOMATED COUNT: 13.1 % (ref 11.5–14.5)
EST. AVERAGE GLUCOSE BLD GHB EST-MCNC: 108 MG/DL
GLUCOSE SERPL-MCNC: 91 MG/DL (ref 74–99)
HBA1C MFR BLD: 5.4 %
HCT VFR BLD AUTO: 48.2 % (ref 41–52)
HDLC SERPL-MCNC: 70.6 MG/DL
HGB BLD-MCNC: 15.4 G/DL (ref 13.5–17.5)
IMM GRANULOCYTES # BLD AUTO: 0.01 X10*3/UL (ref 0–0.7)
IMM GRANULOCYTES NFR BLD AUTO: 0.2 % (ref 0–0.9)
LDLC SERPL CALC-MCNC: 120 MG/DL
LYMPHOCYTES # BLD AUTO: 2.44 X10*3/UL (ref 1.2–4.8)
LYMPHOCYTES NFR BLD AUTO: 41.6 %
MCH RBC QN AUTO: 31.8 PG (ref 26–34)
MCHC RBC AUTO-ENTMCNC: 32 G/DL (ref 32–36)
MCV RBC AUTO: 99 FL (ref 80–100)
MONOCYTES # BLD AUTO: 0.43 X10*3/UL (ref 0.1–1)
MONOCYTES NFR BLD AUTO: 7.3 %
NEUTROPHILS # BLD AUTO: 2.79 X10*3/UL (ref 1.2–7.7)
NEUTROPHILS NFR BLD AUTO: 47.4 %
NON HDL CHOLESTEROL: 145 MG/DL (ref 0–149)
NRBC BLD-RTO: 0 /100 WBCS (ref 0–0)
PLATELET # BLD AUTO: 235 X10*3/UL (ref 150–450)
POTASSIUM SERPL-SCNC: 4.9 MMOL/L (ref 3.5–5.3)
PROT SERPL-MCNC: 7.3 G/DL (ref 6.4–8.2)
PSA SERPL-MCNC: 0.93 NG/ML
RBC # BLD AUTO: 4.85 X10*6/UL (ref 4.5–5.9)
SODIUM SERPL-SCNC: 142 MMOL/L (ref 136–145)
TRIGL SERPL-MCNC: 126 MG/DL (ref 0–149)
TSH SERPL-ACNC: 3.04 MIU/L (ref 0.44–3.98)
VLDL: 25 MG/DL (ref 0–40)
WBC # BLD AUTO: 5.9 X10*3/UL (ref 4.4–11.3)

## 2024-12-04 PROCEDURE — 99396 PREV VISIT EST AGE 40-64: CPT | Performed by: STUDENT IN AN ORGANIZED HEALTH CARE EDUCATION/TRAINING PROGRAM

## 2024-12-04 PROCEDURE — 99214 OFFICE O/P EST MOD 30 MIN: CPT | Performed by: STUDENT IN AN ORGANIZED HEALTH CARE EDUCATION/TRAINING PROGRAM

## 2024-12-04 PROCEDURE — 84443 ASSAY THYROID STIM HORMONE: CPT

## 2024-12-04 PROCEDURE — 80053 COMPREHEN METABOLIC PANEL: CPT

## 2024-12-04 PROCEDURE — 83036 HEMOGLOBIN GLYCOSYLATED A1C: CPT

## 2024-12-04 PROCEDURE — 85025 COMPLETE CBC W/AUTO DIFF WBC: CPT

## 2024-12-04 PROCEDURE — 80061 LIPID PANEL: CPT

## 2024-12-04 PROCEDURE — 36415 COLL VENOUS BLD VENIPUNCTURE: CPT

## 2024-12-04 PROCEDURE — 93000 ELECTROCARDIOGRAM COMPLETE: CPT | Performed by: STUDENT IN AN ORGANIZED HEALTH CARE EDUCATION/TRAINING PROGRAM

## 2024-12-04 PROCEDURE — 3008F BODY MASS INDEX DOCD: CPT | Performed by: STUDENT IN AN ORGANIZED HEALTH CARE EDUCATION/TRAINING PROGRAM

## 2024-12-04 PROCEDURE — 84153 ASSAY OF PSA TOTAL: CPT

## 2024-12-04 RX ORDER — REGADENOSON 0.08 MG/ML
0.4 INJECTION, SOLUTION INTRAVENOUS
OUTPATIENT
Start: 2024-12-04

## 2024-12-04 RX ORDER — AMINOPHYLLINE 25 MG/ML
125 INJECTION, SOLUTION INTRAVENOUS ONCE AS NEEDED
OUTPATIENT
Start: 2024-12-04

## 2024-12-04 RX ORDER — ALBUTEROL SULFATE 90 UG/1
1 INHALANT RESPIRATORY (INHALATION) AS NEEDED
Qty: 18 G | Refills: 11 | Status: SHIPPED | OUTPATIENT
Start: 2024-12-04

## 2024-12-04 RX ORDER — FLUTICASONE FUROATE AND VILANTEROL 100; 25 UG/1; UG/1
1 POWDER RESPIRATORY (INHALATION) DAILY
Qty: 60 EACH | Refills: 5 | Status: SHIPPED | OUTPATIENT
Start: 2024-12-04

## 2024-12-04 SDOH — HEALTH STABILITY - PHYSICAL HEALTH: OCCUPATIONAL EXPOSURE TO UNSPECIFIED RISK FACTOR: Z57.9

## 2024-12-04 ASSESSMENT — LIFESTYLE VARIABLES: HOW OFTEN DO YOU HAVE A DRINK CONTAINING ALCOHOL: 2-3 TIMES A WEEK
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
## 2024-12-13 ENCOUNTER — HOSPITAL ENCOUNTER (OUTPATIENT)
Dept: CARDIOLOGY | Facility: CLINIC | Age: 54
Discharge: HOME | End: 2024-12-13
Payer: COMMERCIAL

## 2024-12-13 DIAGNOSIS — R94.31 NONSPECIFIC ST-T CHANGES: ICD-10-CM

## 2024-12-13 DIAGNOSIS — R94.31 ABNORMAL EKG: ICD-10-CM

## 2024-12-13 LAB
AORTIC VALVE PEAK VELOCITY: 0.99 M/S
AV PEAK GRADIENT: 4 MMHG
AVA (PEAK VEL): 4.15 CM2
EJECTION FRACTION APICAL 4 CHAMBER: 61.4
EJECTION FRACTION: 58 %
LEFT ATRIUM VOLUME AREA LENGTH INDEX BSA: 26.7 ML/M2
LEFT VENTRICLE INTERNAL DIMENSION DIASTOLE: 4.59 CM (ref 3.5–6)
LEFT VENTRICULAR OUTFLOW TRACT DIAMETER: 2.2 CM
MITRAL VALVE E/A RATIO: 1.14
RIGHT VENTRICLE FREE WALL PEAK S': 11.53 CM/S
RIGHT VENTRICLE PEAK SYSTOLIC PRESSURE: 18.8 MMHG
TRICUSPID ANNULAR PLANE SYSTOLIC EXCURSION: 2.9 CM

## 2024-12-13 PROCEDURE — 93306 TTE W/DOPPLER COMPLETE: CPT | Performed by: INTERNAL MEDICINE

## 2024-12-13 PROCEDURE — 93306 TTE W/DOPPLER COMPLETE: CPT

## 2024-12-15 PROBLEM — J45.30 MILD PERSISTENT ASTHMA WITHOUT COMPLICATION (HHS-HCC): Status: ACTIVE | Noted: 2024-12-15

## 2024-12-15 PROBLEM — R06.09 DYSPNEA ON EXERTION: Status: ACTIVE | Noted: 2024-12-15

## 2024-12-15 PROBLEM — R94.31 ABNORMAL EKG: Status: ACTIVE | Noted: 2024-12-15

## 2024-12-15 NOTE — ASSESSMENT & PLAN NOTE
Patient was here for his annual well exam today.   - We discussed family hx today, and family hx was updated in chart.   - Patient was screened for depression today.  - I recommend the following cancer screenings at this time:  [] Colon cancer (start 46yo or earlier if famhx)  [] Lung cancer (50-81yo w 20pk, current or quit w/in 15yr)  [x] Prostate (55-68yo or sooner if famhx)  [] Breast cancer (famhx of BRCA)  - I recommend the following additional screenings at this time:   [] One-time AAA (M 65-76yo ever smoke)  [] HIV (15-66yo, once in lifetime)  [] Hep C (18-78yo, once in lifetime)  [] Syphilis (people at increased risk)  [] GC/CT (F sexually active <24yo, >24yo at increased risk)  - I recommend the following vaccinations at this time:   [] Tdap (q10y)  [] COVID (6yo+)  [] Influenza (annual)  [] HPV (9-46yo)  [x] Shingrix (>49yo) - going to call insurance  [] PNA (>66yo)  - I recommend a whole food plant-based diet, such as diet similar to the Dash or Mediterranean diets.  - I recommend trying exercise roughly 150min/wk.  - I recommend maintaining a healthy weight with a BMI less than 25.  - Do not smoke or vape.  If patient is smoking or vaping, and is unable to quit at this time, I recommend at least attempting to reduce their nicotine intake.  - Alcohol use should be limited and I would actually encourage alcohol cessation.  However, if patient decides to consume alcohol it should be done in moderate use, up to 2 drinks/day.   - Do not use illicit drugs or illegal substances. Do not use medications that are not prescribed to you, and do not take medications that are prescribed to someone else.   - Always consult your doctor prior to starting any new supplements, herbs, or dietary aides.   - Get enough sleep (7-8 hours/night)  - Visit the dentist twice yearly.  - Vision exam at least once per year.  - See orders below.   - Return in 1 year for well exam.
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
## 2024-12-15 NOTE — ASSESSMENT & PLAN NOTE
Patient with abnormal EKG on his routine wellness EKG today.  He has new ST changes with no concern for a STEMI or recent STEMI, but a very sloped J-point.  I suspect this is likely due to his athletic physique.  He is very active with his job and very active with extracurriculars, so I anticipate this is not a sign of cardiovascular disease. He has very reassuring findings today on his physical exam and his vitals. However, we will work him up for these new ST changes in have him see cardiology.  -Labs ordered: CBC, CMP, lipid, A1c, TSH.  -Complete echo ordered.  -Stress test ordered.  Will be unable to do a exercise stress test due to his ST changes, despite him being able to physically do the exercise stress test.  -Will refer him to cardiology for these ST changes.  -We discussed strict precautions of when patient should present to ED for evaluation, patient voiced their understanding.

## 2025-01-21 ENCOUNTER — APPOINTMENT (OUTPATIENT)
Dept: CARDIOLOGY | Facility: CLINIC | Age: 55
End: 2025-01-21
Payer: COMMERCIAL

## 2025-01-21 ENCOUNTER — HOSPITAL ENCOUNTER (OUTPATIENT)
Dept: RADIOLOGY | Facility: CLINIC | Age: 55
End: 2025-01-21
Payer: COMMERCIAL

## 2025-01-21 ENCOUNTER — APPOINTMENT (OUTPATIENT)
Dept: RADIOLOGY | Facility: CLINIC | Age: 55
End: 2025-01-21
Payer: COMMERCIAL

## 2025-01-28 ENCOUNTER — HOSPITAL ENCOUNTER (OUTPATIENT)
Dept: CARDIOLOGY | Facility: HOSPITAL | Age: 55
Discharge: HOME | End: 2025-01-28
Payer: COMMERCIAL

## 2025-01-28 ENCOUNTER — HOSPITAL ENCOUNTER (OUTPATIENT)
Dept: RADIOLOGY | Facility: HOSPITAL | Age: 55
Discharge: HOME | End: 2025-01-28
Payer: COMMERCIAL

## 2025-01-28 DIAGNOSIS — R94.31 NONSPECIFIC ST-T CHANGES: ICD-10-CM

## 2025-01-28 DIAGNOSIS — R94.31 ABNORMAL EKG: ICD-10-CM

## 2025-01-28 PROCEDURE — A9502 TC99M TETROFOSMIN: HCPCS | Performed by: STUDENT IN AN ORGANIZED HEALTH CARE EDUCATION/TRAINING PROGRAM

## 2025-01-28 PROCEDURE — 78452 HT MUSCLE IMAGE SPECT MULT: CPT

## 2025-01-28 PROCEDURE — 93017 CV STRESS TEST TRACING ONLY: CPT

## 2025-01-28 PROCEDURE — 2500000004 HC RX 250 GENERAL PHARMACY W/ HCPCS (ALT 636 FOR OP/ED): Performed by: STUDENT IN AN ORGANIZED HEALTH CARE EDUCATION/TRAINING PROGRAM

## 2025-01-28 PROCEDURE — 3430000001 HC RX 343 DIAGNOSTIC RADIOPHARMACEUTICALS: Performed by: STUDENT IN AN ORGANIZED HEALTH CARE EDUCATION/TRAINING PROGRAM

## 2025-01-28 PROCEDURE — 78452 HT MUSCLE IMAGE SPECT MULT: CPT | Performed by: STUDENT IN AN ORGANIZED HEALTH CARE EDUCATION/TRAINING PROGRAM

## 2025-01-28 RX ORDER — REGADENOSON 0.08 MG/ML
0.4 INJECTION, SOLUTION INTRAVENOUS
Status: COMPLETED | OUTPATIENT
Start: 2025-01-28 | End: 2025-01-28

## 2025-01-28 RX ADMIN — REGADENOSON 0.4 MG: 0.08 INJECTION, SOLUTION INTRAVENOUS at 10:13

## 2025-01-28 RX ADMIN — TETROFOSMIN 35.3 MILLICURIE: 0.23 INJECTION, POWDER, LYOPHILIZED, FOR SOLUTION INTRAVENOUS at 10:02

## 2025-01-28 RX ADMIN — TETROFOSMIN 11 MILLICURIE: 0.23 INJECTION, POWDER, LYOPHILIZED, FOR SOLUTION INTRAVENOUS at 08:48

## 2025-01-29 ENCOUNTER — OFFICE VISIT (OUTPATIENT)
Dept: CARDIOLOGY | Facility: CLINIC | Age: 55
End: 2025-01-29
Payer: COMMERCIAL

## 2025-01-29 VITALS
BODY MASS INDEX: 30.24 KG/M2 | SYSTOLIC BLOOD PRESSURE: 114 MMHG | DIASTOLIC BLOOD PRESSURE: 75 MMHG | HEART RATE: 67 BPM | WEIGHT: 216 LBS | OXYGEN SATURATION: 98 % | HEIGHT: 71 IN

## 2025-01-29 DIAGNOSIS — R94.31 NONSPECIFIC ST-T CHANGES: ICD-10-CM

## 2025-01-29 DIAGNOSIS — E78.2 MIXED HYPERLIPIDEMIA: Primary | ICD-10-CM

## 2025-01-29 DIAGNOSIS — R94.31 ABNORMAL EKG: ICD-10-CM

## 2025-01-29 PROBLEM — E66.811 CLASS 1 OBESITY WITH BODY MASS INDEX (BMI) OF 31.0 TO 31.9 IN ADULT: Status: ACTIVE | Noted: 2025-01-29

## 2025-01-29 PROBLEM — Z98.890 POST-OPERATIVE NAUSEA AND VOMITING: Status: RESOLVED | Noted: 2024-11-30 | Resolved: 2025-01-29

## 2025-01-29 PROBLEM — R11.2 POST-OPERATIVE NAUSEA AND VOMITING: Status: RESOLVED | Noted: 2024-11-30 | Resolved: 2025-01-29

## 2025-01-29 PROCEDURE — 99203 OFFICE O/P NEW LOW 30 MIN: CPT | Performed by: STUDENT IN AN ORGANIZED HEALTH CARE EDUCATION/TRAINING PROGRAM

## 2025-01-29 PROCEDURE — 99213 OFFICE O/P EST LOW 20 MIN: CPT | Performed by: STUDENT IN AN ORGANIZED HEALTH CARE EDUCATION/TRAINING PROGRAM

## 2025-01-29 PROCEDURE — 3008F BODY MASS INDEX DOCD: CPT | Performed by: STUDENT IN AN ORGANIZED HEALTH CARE EDUCATION/TRAINING PROGRAM
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
## 2025-07-15 ENCOUNTER — APPOINTMENT (OUTPATIENT)
Dept: PRIMARY CARE | Facility: CLINIC | Age: 55
End: 2025-07-15
Payer: COMMERCIAL